# Patient Record
Sex: FEMALE | Race: WHITE
[De-identification: names, ages, dates, MRNs, and addresses within clinical notes are randomized per-mention and may not be internally consistent; named-entity substitution may affect disease eponyms.]

---

## 2017-11-18 ENCOUNTER — HOSPITAL ENCOUNTER (EMERGENCY)
Dept: HOSPITAL 25 - UCCORT | Age: 37
Discharge: HOME | End: 2017-11-18
Payer: SELF-PAY

## 2017-11-18 VITALS — SYSTOLIC BLOOD PRESSURE: 153 MMHG | DIASTOLIC BLOOD PRESSURE: 106 MMHG

## 2017-11-18 DIAGNOSIS — F32.9: ICD-10-CM

## 2017-11-18 DIAGNOSIS — F17.210: ICD-10-CM

## 2017-11-18 DIAGNOSIS — I10: ICD-10-CM

## 2017-11-18 DIAGNOSIS — L73.9: ICD-10-CM

## 2017-11-18 DIAGNOSIS — Z11.3: ICD-10-CM

## 2017-11-18 DIAGNOSIS — F41.9: ICD-10-CM

## 2017-11-18 DIAGNOSIS — Z88.1: ICD-10-CM

## 2017-11-18 DIAGNOSIS — N76.0: Primary | ICD-10-CM

## 2017-11-18 PROCEDURE — 81003 URINALYSIS AUTO W/O SCOPE: CPT

## 2017-11-18 PROCEDURE — 99202 OFFICE O/P NEW SF 15 MIN: CPT

## 2017-11-18 PROCEDURE — 87591 N.GONORRHOEAE DNA AMP PROB: CPT

## 2017-11-18 PROCEDURE — 87480 CANDIDA DNA DIR PROBE: CPT

## 2017-11-18 PROCEDURE — G0463 HOSPITAL OUTPT CLINIC VISIT: HCPCS

## 2017-11-18 PROCEDURE — 87491 CHLMYD TRACH DNA AMP PROBE: CPT

## 2017-11-18 PROCEDURE — 87661 TRICHOMONAS VAGINALIS AMPLIF: CPT

## 2017-11-18 PROCEDURE — 87510 GARDNER VAG DNA DIR PROBE: CPT

## 2017-11-18 NOTE — UC
Complaint Female HPI





- HPI Summary


HPI Summary: 





38 y/o female  presents to the urgent care c/o rash in the suprapubic  

area with an itchy white vaginal discharge for the past 2 weeks. Pt reports she 

has s Hx of herpes and she thinks she has a flare up. LMP: 2017 with 

regular menstrual cycles. Pt states her rash is painful at touch. Pain 6/10. 

She has frequency on urination.  Pt states she just move from [place] and she 

hasn't taken her BP medication. Pt denies fever, pelvic pain, lower back pain, 

abdominal pain, N/V/D. 





- History Of Current Complaint


Chief Complaint: UCSkin


Stated Complaint: PERSONAL


Time Seen by Provider: 17 18:54


Hx Obtained From: Patient


Hx Last Menstrual Period: LAST WK


Pregnant?: No


Onset/Duration: Gradual Onset, Lasting Weeks - 3 weeks, Still Present


Timing: Constant


Severity Initially: Mild


Severity Currently: Mild


Pain Intensity: 7


Pain Scale Used: 0-10 Numeric


Character: Not Applicable


Aggravating Factor(s): Other - touch


Alleviating Factor(s): Nothing


Associated Signs And Symptoms: Positive: Vaginal Discharge - white, Nausea.  

Negative: Genital Swelling, Genital Blisters





- Risk Factors


Ectopic Pregnancy Risk Factor: Negative


Ovarian Torsion Risk Factor: Negative





- Allergies/Home Medications


Allergies/Adverse Reactions: 


 Allergies











Allergy/AdvReac Type Severity Reaction Status Date / Time


 


Erythromycin Allergy Severe Hives Verified 17 19:21











Home Medications: 


 Home Medications





Amlodipine Besylate [Norvasc 10 mg tab] 10 mg PO DAILY 17 [History 

Confirmed 17]


Cyclobenzaprine TAB* [Flexeril 10 MG TAB*] 10 mg PO TID PRN 17 [History 

Confirmed 17]


Fluoxetine HCl 40 mg PO 17 [History Confirmed 17]


Ibuprofen [Ibuprofen 200] 800 mg PO DAILY PRN 17 [History Confirmed ]











PMH/Surg Hx/FS Hx/Imm Hx





- Additional Past Medical History


Additional PMH: 


Herpes


Previously Healthy: Yes


Cardiovascular History: Hypertension


Psychological History: Anxiety, Depression





- Surgical History


Surgical History: Yes


Surgery Procedure, Year, and Place: ECTOPIC PREGNANCY x2.  D & C miscarried





- Family History


Known Family History: Positive: Hypertension, Diabetes


Family History: Stroke





- Social History


Occupation: Unemployed


Lives: With Family


Alcohol Use: Occasionally


Substance Use Type: None


Smoking Status (MU): Current Every Day Smoker


Type: Cigarettes


Amount Used/How Often: 1/2 PPD


Length of Time of Smoking/Using Tobacco: 23 YRS





Review of Systems


Constitutional: Negative


Skin: Negative


Eyes: Negative


ENT: Negative


Respiratory: Negative


Cardiovascular: Negative


Gastrointestinal: Negative


Genitourinary: Frequency, Vaginal/Penile Itching, Vaginal/Penile Discharge - 

white


Motor: Negative


Neurovascular: Negative


Musculoskeletal: Negative


Neurological: Negative


Psychological: Negative


Is Patient Immunocompromised?: No


All Other Systems Reviewed And Are Negative: Yes





Physical Exam


Triage Information Reviewed: Yes


Vital Signs: 


 Initial Vital Signs











Temp  98.1 F   17 19:12


 


Pulse  75   17 19:12


 


Resp  18   17 19:12


 


BP  153/106   17 19:12


 


Pulse Ox  99   17 19:12














- Additional Comments


Vitals: reviewed


General: Well developed, well nourihsed female , sitting in the examining table 

w/o any apparent distress.. 


Head: Normocephalic, no lesions. 


Eyes: PERRLA, EOM's full, conjunctivae clear, fundi grossly normal. 


Ears: EAC's clear, TM's normal. Nose: Mucosa normal, no obstruction. 


Throat: Clear, no exudates, no lesions. 


Neck: Supple, no masses, no thyromegaly, no bruits. 


Chest: Lungs clear, no rales, no rhonchi, no wheezes. 


Heart: RR, no murmurs, no rubs, no gallops. 


Abdomen: Soft, no tenderness, no masses, BS normal. 


: Normal, no lesions, no discharge, no hernias noted.


Pelvic: I was assisted by Nurse Valentina. Positive suprapubic are with scattered 

erythematous papules aroun follicle, mild tenderness to palaption, pt shaved 

area. External genitalia within normal limits, no vesicles observed. There is 

no lesions there is no masses noted. Speculum exam: The vaginal walls are 

within normal limits w/ white vaginal discharge,and fishy odor,  no the lesions 

or rashes. The cervix is closed with no lesions or masses. There is no CMT's, 

and no adnexal masses. Sample sent to Lab for  G/C , trichomonas and affirm.


Rectal: No lesions, no hemorrhoids, 


Back: Normal curvature, no tenderness. 


Extremities: FROM, no deformities, no edema, no erythema. 


Neuro: Physiological, no localizing findings. 


Skin: Normal, no rashes, no lesions noted. 














 Complaint Female Dx





- Course


Course Of Treatment: 38 y/o female  presents to the urgent care c/o rash 

in the suprapubic  area with an itchy white vaginal discharge for the past 2 

weeks. Pt reports she has s Hx of herpes and she thinks she has a flare up. LMP

: 2017 with regular menstrual cycles. Pt states her rash is painful at 

touch. Pain 6/10. She has frequency on urination.  Pt states she just move from 

[place] and she hasn't taken her BP medication. Pt denies fever, pelvic pain, 

lower back pain, abdominal pain, N/V/D. Hx obtained. UA leeanna, result: 

negative. Pt with possible Bacterial Vaginosis and folliculitis on suprapubic 

area on examination. Pt's BP elevated, not taking medication for several days 

since she moved recently from [place]. Pt Rx Metronidazol topical cream for BV 

and Bacitracin topical ointent for folliculitis. Advised to call her PCP for a 

refill in BP medication , decrease salt in her diet and get an appt this week 

with a PCP near her.   Advised to f/u with GYN for a PAP. Specimen were sent to 

lab, Advised she will be notified if any abnormal result from lab. Pt 

understood and agreed with plan of care.





- Differential Dx/Diagnosis


Differential Diagnosis/HQI/PQRI: Cervicitis, Pelvic Inflammatory Disease, Renal 

Colic, Urinary Tract Infection, Other - herpes, folliculitis,


Provider Diagnoses: 1- Bacterial Vaginosis.  2-Folliculitis.  3-Screening for 

STI.  4- Uncontrolled HTN





Discharge





- Discharge Plan


Condition: Stable


Disposition: HOME


Prescriptions: 


Bacitracin OINTMENT* 1 applic TOPICAL TID #1 tube


metroNIDAZOLE VAGINAL 0.75%* 1 applic VAGINAL BEDTIME #1 lakisha


Patient Education Materials:  Bacterial Vaginosis (ED), Folliculitis (ED), Low 

Sodium Diet (ED)


Referrals: 


Mraiah Ochoa NP [Nurse Practitioner] - 


Additional Instructions: 


1- Please apply Metronidazole topical cream  as directed. 


2-Apply bacitracin topical cream on affected area as directed to alleviate 

folliculitis. If not improvement or worsen  of symptoms f/u with GYN or PCP for 

further treatment


3- Specimen were sent to lab, if anything abnormal you will receive a call from 

us for further treatment. 


4-Your BP is elevated today, please decrease salt in your diet and f/u with PCP 

for further management. If you develop dizziness, chest pain, severe HA go 

immediately to the ER for further management

## 2018-03-22 ENCOUNTER — HOSPITAL ENCOUNTER (EMERGENCY)
Dept: HOSPITAL 25 - UCEAST | Age: 38
Discharge: HOME | End: 2018-03-22
Payer: SELF-PAY

## 2018-03-22 VITALS — SYSTOLIC BLOOD PRESSURE: 177 MMHG | DIASTOLIC BLOOD PRESSURE: 114 MMHG

## 2018-03-22 DIAGNOSIS — S93.601A: ICD-10-CM

## 2018-03-22 DIAGNOSIS — F17.210: ICD-10-CM

## 2018-03-22 DIAGNOSIS — S93.401A: Primary | ICD-10-CM

## 2018-03-22 DIAGNOSIS — R03.0: ICD-10-CM

## 2018-03-22 DIAGNOSIS — Z88.3: ICD-10-CM
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
## 2018-03-22 PROCEDURE — G0463 HOSPITAL OUTPT CLINIC VISIT: HCPCS

## 2018-03-22 PROCEDURE — 99212 OFFICE O/P EST SF 10 MIN: CPT

## 2018-03-22 NOTE — UC
Cindy CORDOVA Gabriel, scribed for Cj Kebede MD on 03/22/18 at 1731 .





Lower Extremity/Ankle HPI





- HPI Summary


HPI Summary: 





This patient is a 37 year old F [place] [facility] [place] 

with a chief complaint of LLE pain and ecchymosis at the right knee since 3-18-

18. The patient bumped her knee against a metal lift and seen and x-rayed, 

nothing significant was found. The patient rates the pain 7/10 in severity. 

Patient reports swelling and ecchymosis of the right ankle. 





- History of Current Complaint


Chief Complaint: UCLowerExtremity


Stated Complaint: SWOLLEN KNEE, ANKLE AND FOOT


Time Seen by Provider: 03/22/18 17:23


Hx Obtained From: Patient


Hx Last Menstrual Period: LAST WK


Onset/Duration: Still Present


Severity Initially: Moderate


Severity Currently: Moderate


Pain Intensity: 7


Pain Scale Used: 0-10 Numeric


Able to Bear Weight: Yes





- Allergies/Home Medications


Allergies/Adverse Reactions: 


 Allergies











Allergy/AdvReac Type Severity Reaction Status Date / Time


 


erythromycin base Allergy  Hives Verified 03/22/18 17:17











Home Medications: 


 Home Medications





DULoxetine DR CAP* [Cymbalta CAP*] 60 mg PO DAILY 03/22/18 [History Confirmed 03 /22/18]


Gabapentin 400 mg PO TID 03/22/18 [History Confirmed 03/22/18]











PMH/Surg Hx/FS Hx/Imm Hx


Cardiovascular History: Hypertension


Other History Of: 


   Negative For: Anticoagulant Therapy





- Surgical History


Surgical History: Yes


Surgery Procedure, Year, and Place: ECTOPIC PREGNANCY x2.  D & C miscarried





- Family History


Known Family History: Positive: Hypertension, Diabetes


   Negative: Respiratory Disease, Seizure Disorder


Family History: Stroke





- Social History


Occupation: Employed Full-time


Alcohol Use: Occasionally


Substance Use Type: None


Smoking Status (MU): Current Every Day Smoker


Type: Cigarettes


Amount Used/How Often: 1/2 PPD


Length of Time of Smoking/Using Tobacco: 23 YRS





Review of Systems


Constitutional: Negative - fever


Skin: Bruising


Musculoskeletal: Edema, Other: - pain at right ankle and knee


All Other Systems Reviewed And Are Negative: Yes





Physical Exam





- Summary


Physical Exam Summary: 





VITAL SIGNS: Reviewed.


GENERAL:  Patient is a well developed and nourished F who is lying comfortable 

in the stretcher.  Patient is not in any acute respiratory distress.


HEAD AND FACE: Normocephalic


EYES: PERRLA, EOMI x 2.


EARS: Hearing grossly intact.


MOUTH: Oropharynx within normal limits.


NECK: Supple, trachea is midline, no adenopathy, no JVD, no carotid bruit.


CHEST: Symmetric, no tenderness at palpation


LUNGS: Clear to auscultation bilaterally. No wheezing or crackles.


CVS: Regular rate and rhythm, S1 and S2 present, no murmurs or gallops 

appreciated.


ABDOMEN: Soft, non-tender. Bowel sounds are normal. No abdominal abnormal 

pulsations.


EXTREMITIES: Full ROM in all major joints. There is mild swelling and 

ecchymosis in the right ankle. Onychomycosis of the right foot. 


NEURO: Alert and oriented x 3. No acute neurological deficits. Speech is normal 

and follows commands.


SKIN: Dry and warm





Triage Information Reviewed: Yes


Vital Signs: 


 Initial Vital Signs











Temp  97.2 F   03/22/18 17:12


 


Pulse  73   03/22/18 17:12


 


Resp  20   03/22/18 17:12


 


BP  177/114   03/22/18 17:12


 


Pulse Ox  100   03/22/18 17:12











Vital Signs Reviewed: Yes





Diagnostics





- Radiology


  ** foot xray


Radiology Interpretation Completed By: Radiologist - SOFT TISSUE SWELLING. NO 

ACUTE OSSEOUS INJURY TO THE RIGHT ANKLE OR RIGHT FOOT. IF SYMPTOMS PERSIST, 

RECOMMEND REPEAT IMAGING. Dr. Kebede has reviewed this report.





  ** ankle xray


Radiology Interpretation Completed By: Radiologist - NO ACUTE OSSEOUS INJURY TO 

THE RIGHT ANKLE OR RIGHT FOOT. IF SYMPTOMS PERSIST, RECOMMEND REPEAT IMAGING. 

Dr. Kebede has reviewed this report.





Re-Evaluation





- Re-Evaluation


  ** First Eval


Re-Evaluation Time: 17:35


Change: Unchanged


Comment: The patients blood pressure was rechecked manually and it was 140/90.





  ** Second Eval


Re-Evaluation Time: 18:24


Change: Unchanged


Comment: I discussed test results and discharge with the patient.





Lower Extremity Course/Dx





- Course


Course Of Treatment: The patient was found to have increased BP in UC. The 

patient will follow up with PCP for better control of BP. Right foot Xray 

reveals, SOFT TISSUE SWELLING. NO ACUTE OSSEOUS INJURY TO THE RIGHT ANKLE OR 

RIGHT FOOT. IF.  SYMPTOMS PERSIST, RECOMMEND REPEAT IMAGING.  Right ankle Xray 

reveals, SOFT TISSUE SWELLING. NO ACUTE OSSEOUS INJURY TO THE RIGHT ANKLE OR 

RIGHT FOOT. IF.  SYMPTOMS PERSIST, RECOMMEND REPEAT IMAGING. I discussed all 

the findings and test results with the patient. Patient was instructed to 

return to the urgent care or go to ER immediately if any of the symptoms return 

or worsens. Plan of care was discussed with the patient, and patient 

understands and agrees. All questions were answered to patient satisfaction. 

There were no further complaints or concerns.





- Differential Dx/Diagnosis


Provider Diagnoses: HTN, ankle sprain, and foot sprain





Discharge





- Sign-Out/Discharge


Documenting (check all that apply): Discharge





- Discharge Plan


Condition: Stable


Disposition: HOME


Prescriptions: 


Ibuprofen TAB* [Motrin TAB* 600 MG] 600 mg PO Q8H PRN #30 tab


 PRN Reason: Pain


Patient Education Materials:  Ankle Sprain (DC), Foot Sprain (ED)


Referrals: 


ROBERTA Vogel [Primary Care Provider] - 


Additional Instructions: 


Your blood pressure was elevated during today's visit. Please follow up with 

your primary care provider in 1-2 weeks. 


Take medications as instructed


Increase your fluid intake


Return to the UC if symptoms worsen








The documentation as recorded by the Cindy garnica Gabriel accurately reflects 

the service I personally performed and the decisions made by me, Cj Kebede MD.

## 2018-03-22 NOTE — RAD
HISTORY: Right ankle and foot pain and swelling



COMPARISONS: None



VIEWS: 6, Frontal, lateral, and oblique views of the right ankle and of the right foot



FINDINGS:



BONE DENSITY: Normal.

BONES: There is no displaced fracture.    

JOINTS: There is no arthropathy.    

ALIGNMENT: There is no dislocation. 

SOFT TISSUES: There is circumferential soft tissue swelling.



OTHER FINDINGS: None.



IMPRESSION: 

SOFT TISSUE SWELLING. NO ACUTE OSSEOUS INJURY TO THE RIGHT ANKLE OR RIGHT FOOT. IF

SYMPTOMS PERSIST, RECOMMEND REPEAT IMAGING.